# Patient Record
Sex: FEMALE | Race: WHITE | NOT HISPANIC OR LATINO | Employment: FULL TIME | URBAN - METROPOLITAN AREA
[De-identification: names, ages, dates, MRNs, and addresses within clinical notes are randomized per-mention and may not be internally consistent; named-entity substitution may affect disease eponyms.]

---

## 2024-04-17 ENCOUNTER — OFFICE VISIT (OUTPATIENT)
Dept: URGENT CARE | Facility: CLINIC | Age: 54
End: 2024-04-17

## 2024-04-17 ENCOUNTER — APPOINTMENT (OUTPATIENT)
Dept: URGENT CARE | Facility: CLINIC | Age: 54
End: 2024-04-17

## 2024-04-17 VITALS
SYSTOLIC BLOOD PRESSURE: 104 MMHG | BODY MASS INDEX: 25.11 KG/M2 | OXYGEN SATURATION: 97 % | RESPIRATION RATE: 16 BRPM | HEIGHT: 67 IN | TEMPERATURE: 97.1 F | DIASTOLIC BLOOD PRESSURE: 62 MMHG | WEIGHT: 160 LBS | HEART RATE: 92 BPM

## 2024-04-17 DIAGNOSIS — Z91.09 ENVIRONMENTAL ALLERGIES: ICD-10-CM

## 2024-04-17 PROCEDURE — 99203 OFFICE O/P NEW LOW 30 MIN: CPT | Performed by: FAMILY MEDICINE

## 2024-04-17 ASSESSMENT — FIBROSIS 4 INDEX: FIB4 SCORE: 2.48

## 2024-04-17 NOTE — PROGRESS NOTES
"  Subjective:      53 y.o. female presents to urgent care for cold symptoms that have been present since January.  She is experiencing increased nasal congestion, bilateral ear pain, and sore throat.  No fever or cough.  No tobacco product use.  No history of asthma or COPD.  She is vaccinated against COVID.  No known sick contacts.        She denies any other questions or concerns at this time.    Current problem list, medication, and past medical/surgical history were reviewed in Epic.    ROS  See HPI     Objective:      /62   Pulse 92   Temp 36.2 °C (97.1 °F)   Resp 16   Ht 1.702 m (5' 7\")   Wt 72.6 kg (160 lb)   SpO2 97%   BMI 25.06 kg/m²     Physical Exam  Constitutional:       General: She is not in acute distress.     Appearance: She is not diaphoretic.   HENT:      Right Ear: Ear canal and external ear normal. Tympanic membrane is bulging. Tympanic membrane is not erythematous.      Left Ear: Ear canal and external ear normal. Tympanic membrane is bulging. Tympanic membrane is not erythematous.      Mouth/Throat:      Tongue: Tongue does not deviate from midline.      Palate: No lesions.      Pharynx: Uvula midline. No oropharyngeal exudate or posterior oropharyngeal erythema.      Tonsils: No tonsillar exudate.   Cardiovascular:      Rate and Rhythm: Normal rate and regular rhythm.      Heart sounds: Normal heart sounds.   Pulmonary:      Effort: Pulmonary effort is normal. No respiratory distress.      Breath sounds: Normal breath sounds.   Neurological:      Mental Status: She is alert.   Psychiatric:         Mood and Affect: Affect normal.         Judgment: Judgment normal.       Assessment/Plan:     1. Environmental allergies  No sign of bacterial infection on physical exam.  Most consistent with environmental allergies.  She was encouraged to use Flonase and a nondrowsy antihistamine daily.      Instructed to return to Urgent Care or nearest Emergency Department if symptoms fail to " improve, for any change in condition, further concerns, or new concerning symptoms. Patient states understanding of the plan of care and discharge instructions.    Aileen Carrillo M.D.

## 2024-04-17 NOTE — LETTER
April 17, 2024    To Whom It May Concern:         This is confirmation that Laila Hewitt Jorge attended her scheduled appointment with Aileen Carrillo M.D. on 4/17/24. She may return to work tomorrow without any restrictions.          If you have any questions please do not hesitate to call me at the phone number listed below.    Sincerely,          Aileen Carrillo M.D.  [phone]

## 2025-01-31 ENCOUNTER — OFFICE VISIT (OUTPATIENT)
Dept: URGENT CARE | Facility: CLINIC | Age: 55
End: 2025-01-31
Payer: MEDICAID

## 2025-01-31 VITALS
DIASTOLIC BLOOD PRESSURE: 74 MMHG | OXYGEN SATURATION: 97 % | HEART RATE: 90 BPM | HEIGHT: 67 IN | SYSTOLIC BLOOD PRESSURE: 110 MMHG | RESPIRATION RATE: 16 BRPM | WEIGHT: 166.3 LBS | BODY MASS INDEX: 26.1 KG/M2 | TEMPERATURE: 96.9 F

## 2025-01-31 DIAGNOSIS — J01.00 ACUTE NON-RECURRENT MAXILLARY SINUSITIS: ICD-10-CM

## 2025-01-31 RX ORDER — METHYLPREDNISOLONE 4 MG/1
4 TABLET ORAL DAILY
Qty: 21 EACH | Refills: 0 | Status: SHIPPED | OUTPATIENT
Start: 2025-01-31

## 2025-01-31 RX ORDER — ACETAMINOPHEN, DEXTROMETHORPHAN HYDROBROMIDE, GUAIFENESIN, AND PHENYLEPHRINE HYDROCHLORIDE 325; 10; 200; 5 MG/1; MG/1; MG/1; MG/1
TABLET, FILM COATED ORAL
COMMUNITY

## 2025-01-31 NOTE — PROGRESS NOTES
Chief Complaint   Patient presents with    Sinus Problem     xThursday. Vomiting, sinus pressure, ringing in both ears, nasal congestion, runny nose. Requesting work note for Thursday and Friday.        HISTORY OF PRESENT ILLNESS: Patient is a pleasant 54 y.o. female who presents today due to two days of nasal congestion, tactile fever, chills, headache, and sinus pressure. Endorses eye pressure and teeth pain. Symptoms started after 24 hours of vomiting and diarrhea.  She denies associated cough, difficulty breathing, confusion. She has tried OTC cold/sinus medication at home without much improvement. Denies a history of seasonal allergies or sinus infections in the past. No known ill contacts at home. No recent antibiotic usage.       There are no active problems to display for this patient.      Allergies:Patient has no known allergies.    Current Outpatient Medications Ordered in Epic   Medication Sig Dispense Refill    Phenylephrine-Ibuprofen (ADVIL SINUS CONGESTION & PAIN PO) Take  by mouth.      Phenylephrine-DM-GG-APAP (MUCINEX SINUS-MAX) 5--325 MG Tab Take  by mouth.      methylPREDNISolone (MEDROL DOSEPAK) 4 MG Tablet Therapy Pack Take 1 Tablet by mouth every day. Take with food. 21 Each 0    amoxicillin-clavulanate (AUGMENTIN) 875-125 MG Tab Take 1 Tablet by mouth 2 times a day for 7 days. 14 Tablet 0     No current Epic-ordered facility-administered medications on file.       History reviewed. No pertinent past medical history.    Social History     Tobacco Use    Smoking status: Never    Smokeless tobacco: Never   Vaping Use    Vaping status: Never Used   Substance Use Topics    Alcohol use: Not Currently    Drug use: Not Currently       No family status information on file.   History reviewed. No pertinent family history.    ROS:  Review of Systems   Constitutional: Positive for tactile fever, chills, fatigue. Negative for weight loss.   HENT: Positive for dental pressure, sinus pressure, sore  "throat, nasal congestion. Negative for ear pain, nosebleeds, neck pain.    Eyes: Positive for eye pressure.   Neuro: Positive for headache. Negative for sensory changes, weakness, seizure, LOC.  Cardiovascular: Negative for chest pain, palpitations, orthopnea and leg swelling.   Respiratory: Negative for cough, sputum production, shortness of breath and wheezing.   Gastrointestinal: Negative for abdominal pain, nausea, vomiting or diarrhea today.    Skin: Negative for rash, diaphoresis.     Exam:  /74   Pulse 90   Temp 36.1 °C (96.9 °F)   Resp 16   Ht 1.702 m (5' 7\")   Wt 75.4 kg (166 lb 4.8 oz)   SpO2 97%   General: well-nourished, well-developed female in NAD  Head: normocephalic, atraumatic  Eyes: PERRLA, no conjunctival injection, acuity grossly intact, lids normal.  Ears: normal shape and symmetry, no tenderness, no discharge. External canals are without any significant edema or erythema. Tympanic membranes are without any inflammation, no effusion. Gross auditory acuity is intact.  Nose: symmetrical without tenderness, erythema and swelling noted bilateral turbinates, clear discharge. Maxillary sinus tenderness.   Mouth/Throat: reasonable hygiene, no exudates or tonsillar enlargement. Erythema is present.   Neck: no masses, range of motion within normal limits, no tracheal deviation. No obvious thyroid enlargement.   Lymph: no cervical adenopathy. No supraclavicular adenopathy.   Neuro: alert and oriented. Cranial nerves 1-12 grossly intact. No sensory deficit.   Cardiovascular: regular rate and rhythm. No edema.  Pulmonary: no distress. Chest is symmetrical with respiration, no wheezes, crackles, or rhonchi.   Musculoskeletal: no clubbing, appropriate muscle tone, gait is stable.  Skin: warm, dry, intact, no clubbing, no cyanosis, no rashes.   Psych: appropriate mood, affect, judgement.         Assessment/Plan:  1. Acute non-recurrent maxillary sinusitis  methylPREDNISolone (MEDROL DOSEPAK) 4 MG " Tablet Therapy Pack    amoxicillin-clavulanate (AUGMENTIN) 875-125 MG Tab            Will treat with antibiotic due to severe presentation. Augmentin and Medrol as directed. OTC oral allergy medication encouraged.  Sleep with HOB elevated, humidifier at night, rest, increase fluid intake.   Supportive care, differential diagnoses, and indications for immediate follow-up discussed with patient.   Pathogenesis of diagnosis discussed including typical length and natural progression.   Instructed to return to clinic or nearest emergency department for any change in condition, further concerns, or worsening of symptoms.  Patient states understanding of the plan of care and discharge instructions.  Instructed to make an appointment, for follow up, with her primary care provider.        Please note that this dictation was created using voice recognition software. I have made every reasonable attempt to correct obvious errors, but I expect that there are errors of grammar and possibly content that I did not discover before finalizing the note.       MICHELLE Barajas.

## 2025-01-31 NOTE — LETTER
January 31, 2025         Patient: Laila Jorge   YOB: 1970   Date of Visit: 1/31/2025           To Whom it May Concern:    Laila Jorge was seen in my clinic on 1/31/2025. She may be excused from work yesterday and today.     If you have any questions or concerns, please don't hesitate to call.        Sincerely,           MICHELLE Barajas.  Electronically Signed

## 2025-03-18 ENCOUNTER — OFFICE VISIT (OUTPATIENT)
Dept: URGENT CARE | Facility: CLINIC | Age: 55
End: 2025-03-18
Payer: MEDICAID

## 2025-03-18 ENCOUNTER — HOSPITAL ENCOUNTER (OUTPATIENT)
Dept: LAB | Facility: MEDICAL CENTER | Age: 55
End: 2025-03-18
Attending: NURSE PRACTITIONER
Payer: MEDICAID

## 2025-03-18 VITALS
WEIGHT: 166 LBS | TEMPERATURE: 97.8 F | HEART RATE: 99 BPM | RESPIRATION RATE: 18 BRPM | OXYGEN SATURATION: 96 % | SYSTOLIC BLOOD PRESSURE: 122 MMHG | DIASTOLIC BLOOD PRESSURE: 80 MMHG | BODY MASS INDEX: 26.06 KG/M2 | HEIGHT: 67 IN

## 2025-03-18 DIAGNOSIS — K92.1 BLOODY STOOLS: ICD-10-CM

## 2025-03-18 DIAGNOSIS — R10.30 LOWER ABDOMINAL PAIN: ICD-10-CM

## 2025-03-18 LAB
ALBUMIN SERPL BCP-MCNC: 4.8 G/DL (ref 3.2–4.9)
ALBUMIN/GLOB SERPL: 1.7 G/DL
ALP SERPL-CCNC: 77 U/L (ref 30–99)
ALT SERPL-CCNC: 99 U/L (ref 2–50)
ANION GAP SERPL CALC-SCNC: 17 MMOL/L (ref 7–16)
AST SERPL-CCNC: 162 U/L (ref 12–45)
BASOPHILS # BLD AUTO: 1.2 % (ref 0–1.8)
BASOPHILS # BLD: 0.1 K/UL (ref 0–0.12)
BILIRUB SERPL-MCNC: 0.8 MG/DL (ref 0.1–1.5)
BUN SERPL-MCNC: 8 MG/DL (ref 8–22)
CALCIUM ALBUM COR SERPL-MCNC: 9.2 MG/DL (ref 8.5–10.5)
CALCIUM SERPL-MCNC: 9.8 MG/DL (ref 8.5–10.5)
CHLORIDE SERPL-SCNC: 106 MMOL/L (ref 96–112)
CO2 SERPL-SCNC: 23 MMOL/L (ref 20–33)
CREAT SERPL-MCNC: 0.69 MG/DL (ref 0.5–1.4)
EOSINOPHIL # BLD AUTO: 0.1 K/UL (ref 0–0.51)
EOSINOPHIL NFR BLD: 1.2 % (ref 0–6.9)
ERYTHROCYTE [DISTWIDTH] IN BLOOD BY AUTOMATED COUNT: 45.1 FL (ref 35.9–50)
GFR SERPLBLD CREATININE-BSD FMLA CKD-EPI: 102 ML/MIN/1.73 M 2
GLOBULIN SER CALC-MCNC: 2.8 G/DL (ref 1.9–3.5)
GLUCOSE SERPL-MCNC: 97 MG/DL (ref 65–99)
HCT VFR BLD AUTO: 48 % (ref 37–47)
HGB BLD-MCNC: 16.5 G/DL (ref 12–16)
IMM GRANULOCYTES # BLD AUTO: 0.01 K/UL (ref 0–0.11)
IMM GRANULOCYTES NFR BLD AUTO: 0.1 % (ref 0–0.9)
LYMPHOCYTES # BLD AUTO: 2.95 K/UL (ref 1–4.8)
LYMPHOCYTES NFR BLD: 34.3 % (ref 22–41)
MCH RBC QN AUTO: 33.7 PG (ref 27–33)
MCHC RBC AUTO-ENTMCNC: 34.4 G/DL (ref 32.2–35.5)
MCV RBC AUTO: 98.2 FL (ref 81.4–97.8)
MONOCYTES # BLD AUTO: 0.78 K/UL (ref 0–0.85)
MONOCYTES NFR BLD AUTO: 9.1 % (ref 0–13.4)
NEUTROPHILS # BLD AUTO: 4.65 K/UL (ref 1.82–7.42)
NEUTROPHILS NFR BLD: 54.1 % (ref 44–72)
NRBC # BLD AUTO: 0 K/UL
NRBC BLD-RTO: 0 /100 WBC (ref 0–0.2)
PLATELET # BLD AUTO: 188 K/UL (ref 164–446)
PMV BLD AUTO: 9.5 FL (ref 9–12.9)
POTASSIUM SERPL-SCNC: 3.9 MMOL/L (ref 3.6–5.5)
PROT SERPL-MCNC: 7.6 G/DL (ref 6–8.2)
RBC # BLD AUTO: 4.89 M/UL (ref 4.2–5.4)
SODIUM SERPL-SCNC: 146 MMOL/L (ref 135–145)
WBC # BLD AUTO: 8.6 K/UL (ref 4.8–10.8)

## 2025-03-18 PROCEDURE — 36415 COLL VENOUS BLD VENIPUNCTURE: CPT

## 2025-03-18 PROCEDURE — 3079F DIAST BP 80-89 MM HG: CPT | Performed by: NURSE PRACTITIONER

## 2025-03-18 PROCEDURE — 3074F SYST BP LT 130 MM HG: CPT | Performed by: NURSE PRACTITIONER

## 2025-03-18 PROCEDURE — 80053 COMPREHEN METABOLIC PANEL: CPT

## 2025-03-18 PROCEDURE — 99214 OFFICE O/P EST MOD 30 MIN: CPT | Performed by: NURSE PRACTITIONER

## 2025-03-18 PROCEDURE — 85025 COMPLETE CBC W/AUTO DIFF WBC: CPT

## 2025-03-18 NOTE — LETTER
March 18, 2025         Patient: Laila Jorge   YOB: 1970   Date of Visit: 3/18/2025           To Whom it May Concern:    Laila Jorge was seen in my clinic on 3/18/2025. She may be excused from work yesterday, today, and tomorrow if needed.     If you have any questions or concerns, please don't hesitate to call.        Sincerely,           MICHELLE Barajas.  Electronically Signed

## 2025-03-18 NOTE — LETTER
March 18, 2025         Patient: Laila Jorge   YOB: 1970   Date of Visit: 3/18/2025           To Whom it May Concern:    Laila Jorge was seen in my clinic on 3/18/2025. She may be excused from work today.     If you have any questions or concerns, please don't hesitate to call.        Sincerely,           ESTEFANI Barajas  Electronically Signed

## 2025-03-18 NOTE — PROGRESS NOTES
Chief Complaint   Patient presents with    Bloody Stools     Pt states it does not feel like a hemorrhoid, bloating, painful, x 1 day        HISTORY OF PRESENT ILLNESS: Patient is a pleasant 54 y.o. female who presents to urgent care today with concerns of bloody stools.  Patient notes she had an episode of bright red blood in her stools yesterday.  Over the past month she has had lower abdominal pain with bloating.  She denies previous history of same.  She has seen her GYN who has planned an ultrasound for her.  She has had a colonoscopy 5 years ago, normal.  She has not take any medication for symptom relief.  She does not have a PCP.    There are no active problems to display for this patient.      Allergies:Patient has no known allergies.    Current Outpatient Medications Ordered in Epic   Medication Sig Dispense Refill    Phenylephrine-Ibuprofen (ADVIL SINUS CONGESTION & PAIN PO) Take  by mouth.      Phenylephrine-DM-GG-APAP (MUCINEX SINUS-MAX) 5--325 MG Tab Take  by mouth.       No current Epic-ordered facility-administered medications on file.       History reviewed. No pertinent past medical history.    Social History     Tobacco Use    Smoking status: Never    Smokeless tobacco: Never   Vaping Use    Vaping status: Never Used   Substance Use Topics    Alcohol use: Not Currently    Drug use: Not Currently       No family status information on file.   History reviewed. No pertinent family history.    ROS:  Review of Systems   Constitutional: Negative for fever, chills, weight loss, malaise, and fatigue.   HENT: Negative for ear pain, nosebleeds, congestion, sore throat and neck pain.    Eyes: Negative for vision changes.   Neuro: Negative for headache, sensory changes, weakness, seizure, LOC.   Cardiovascular: Negative for chest pain, palpitations, orthopnea and leg swelling.   Respiratory: Negative for cough, sputum production, shortness of breath and wheezing.   Gastrointestinal: Positive for  "abdominal pain, bloating, rectal bleeding.  Negative for nausea, vomiting or diarrhea.   Genitourinary: Negative for dysuria, urgency and frequency.  Musculoskeletal: Negative for falls, neck pain, back pain, joint pain, myalgias.   Skin: Negative for rash, diaphoresis.     Exam:  /80   Pulse 99   Temp 36.6 °C (97.8 °F)   Resp 18   Ht 1.702 m (5' 7\")   Wt 75.3 kg (166 lb)   SpO2 96%   General: well-nourished, well-developed female in NAD  Head: normocephalic, atraumatic  Eyes: PERRLA, no conjunctival injection, acuity grossly intact, lids normal.  Ears: normal shape and symmetry, no tenderness, no discharge. External canals are without any significant edema or erythema. Tympanic membranes are without any inflammation, no effusion. Gross auditory acuity is intact.  Nose: symmetrical without tenderness, no discharge.  Mouth/Throat: reasonable hygiene, no erythema, exudates or tonsillar enlargement.  Neck: no masses, range of motion within normal limits, no tracheal deviation. No obvious thyroid enlargement.   Lymph: no cervical adenopathy. No supraclavicular adenopathy.   Neuro: alert and oriented. Cranial nerves 1-12 grossly intact. No sensory deficit.   Cardiovascular: regular rate and rhythm. No edema.  Pulmonary: no distress. Chest is symmetrical with respiration, no wheezes, crackles, or rhonchi.   Abdomen: soft, diffuse lower tenderness, no guarding, no hepatosplenomegaly.  Musculoskeletal: no clubbing, appropriate muscle tone, gait is stable.  Skin: warm, dry, intact, no clubbing, no cyanosis, no rashes.   Psych: appropriate mood, affect, judgement.         Assessment/Plan:  1. Lower abdominal pain  CBC WITH DIFFERENTIAL    CT-ABDOMEN-PELVIS WITH    Comp Metabolic Panel    CANCELED: Comp Metabolic Panel      2. Bloody stools  CBC WITH DIFFERENTIAL    CT-ABDOMEN-PELVIS WITH    Comp Metabolic Panel    CANCELED: Comp Metabolic Panel          The patient is a pleasant 54-year-old who presents with GI " symptoms.  States over the past month she has had lower abdominal pressure and bloating.  She had onset of bright red rectal bleeding yesterday.  CBC, CMP, and CT abdomen ordered for patient, will follow-up accordingly.  Supportive care, differential diagnoses, and indications for immediate follow-up discussed with patient.   Pathogenesis of diagnosis discussed including typical length and natural progression.   Instructed to return to clinic or nearest emergency department for any change in condition, further concerns, or worsening of symptoms.  Patient states understanding of the plan of care and discharge instructions.  Instructed to make an appointment, to establish care and for follow up, with a primary care provider.        Please note that this dictation was created using voice recognition software. I have made every reasonable attempt to correct obvious errors, but I expect that there are errors of grammar and possibly content that I did not discover before finalizing the note.      MICHELLE Barajas.

## 2025-03-19 ENCOUNTER — RESULTS FOLLOW-UP (OUTPATIENT)
Dept: URGENT CARE | Facility: CLINIC | Age: 55
End: 2025-03-19

## 2025-03-19 DIAGNOSIS — R10.30 LOWER ABDOMINAL PAIN: ICD-10-CM

## 2025-03-19 DIAGNOSIS — K92.1 BLOODY STOOLS: ICD-10-CM

## 2025-03-19 DIAGNOSIS — R74.01 TRANSAMINITIS: ICD-10-CM

## 2025-03-20 NOTE — Clinical Note
REFERRAL APPROVAL NOTICE         Sent on March 20, 2025                   Laila Jorge  [address] [place] [address]                   Dear Ms. Jorge,    After a careful review of the medical information and benefit coverage, [facility] has processed your referral. See below for additional details.    If applicable, you must be actively enrolled with your insurance for coverage of the authorized service. If you have any questions regarding your coverage, please contact your insurance directly.    REFERRAL INFORMATION   Referral #:  [id number]  Referred-To Provider    Referred-By Provider:  Gastroenterology    ESTEFANI Barajas   [facility]      [address]  [address]  [place] [id number]  [phone] [address]  [place] [address]  [phone]    Referral Start Date:  03/20/2025  Referral End Date:   03/20/2026             SCHEDULING  If you do not already have an appointment, please call [phone] to make an appointment.     MORE INFORMATION  If you do not already have a [employer] account, sign up at: [email or web address]  You can access your medical information, make appointments, see lab results, billing information, and more.  If you have questions regarding this referral, please contact  the [facility] Referrals department at:             [phone]. Monday - Friday 8:00AM - 5:00PM.     Sincerely,    [facility]

## 2025-06-09 ENCOUNTER — OFFICE VISIT (OUTPATIENT)
Dept: MEDICAL GROUP | Facility: CLINIC | Age: 55
End: 2025-06-09
Payer: MEDICAID

## 2025-06-09 VITALS
DIASTOLIC BLOOD PRESSURE: 74 MMHG | BODY MASS INDEX: 24.97 KG/M2 | OXYGEN SATURATION: 95 % | WEIGHT: 159.1 LBS | HEART RATE: 85 BPM | RESPIRATION RATE: 16 BRPM | TEMPERATURE: 97.1 F | SYSTOLIC BLOOD PRESSURE: 107 MMHG | HEIGHT: 67 IN

## 2025-06-09 DIAGNOSIS — K59.09 CHRONIC CONSTIPATION: Primary | ICD-10-CM

## 2025-06-09 DIAGNOSIS — Z91.89 AT RISK FOR DEPRESSION: ICD-10-CM

## 2025-06-09 DIAGNOSIS — Z73.3 MENTAL DISTRESS EVIDENT ON EXAMINATION: ICD-10-CM

## 2025-06-09 DIAGNOSIS — F33.2 SEVERE EPISODE OF RECURRENT MAJOR DEPRESSIVE DISORDER, WITHOUT PSYCHOTIC FEATURES (HCC): ICD-10-CM

## 2025-06-09 DIAGNOSIS — Z87.898 HISTORY OF ALCOHOL USE DISORDER: ICD-10-CM

## 2025-06-09 PROBLEM — F32.9 MAJOR DEPRESSIVE DISORDER: Status: ACTIVE | Noted: 2025-06-09

## 2025-06-09 PROCEDURE — 3074F SYST BP LT 130 MM HG: CPT

## 2025-06-09 PROCEDURE — 99213 OFFICE O/P EST LOW 20 MIN: CPT | Mod: GE

## 2025-06-09 PROCEDURE — 3078F DIAST BP <80 MM HG: CPT

## 2025-06-09 RX ORDER — POLYETHYLENE GLYCOL 3350 17 G/17G
17 POWDER, FOR SOLUTION ORAL DAILY
COMMUNITY

## 2025-06-09 SDOH — HEALTH STABILITY: PHYSICAL HEALTH: ON AVERAGE, HOW MANY MINUTES DO YOU ENGAGE IN EXERCISE AT THIS LEVEL?: PATIENT DECLINED

## 2025-06-09 SDOH — ECONOMIC STABILITY: TRANSPORTATION INSECURITY
IN THE PAST 12 MONTHS, HAS LACK OF TRANSPORTATION KEPT YOU FROM MEETINGS, WORK, OR FROM GETTING THINGS NEEDED FOR DAILY LIVING?: PATIENT DECLINED

## 2025-06-09 SDOH — ECONOMIC STABILITY: FOOD INSECURITY: WITHIN THE PAST 12 MONTHS, THE FOOD YOU BOUGHT JUST DIDN'T LAST AND YOU DIDN'T HAVE MONEY TO GET MORE.: PATIENT DECLINED

## 2025-06-09 SDOH — ECONOMIC STABILITY: INCOME INSECURITY: HOW HARD IS IT FOR YOU TO PAY FOR THE VERY BASICS LIKE FOOD, HOUSING, MEDICAL CARE, AND HEATING?: PATIENT DECLINED

## 2025-06-09 SDOH — ECONOMIC STABILITY: INCOME INSECURITY: IN THE LAST 12 MONTHS, WAS THERE A TIME WHEN YOU WERE NOT ABLE TO PAY THE MORTGAGE OR RENT ON TIME?: PATIENT DECLINED

## 2025-06-09 SDOH — ECONOMIC STABILITY: TRANSPORTATION INSECURITY
IN THE PAST 12 MONTHS, HAS THE LACK OF TRANSPORTATION KEPT YOU FROM MEDICAL APPOINTMENTS OR FROM GETTING MEDICATIONS?: PATIENT DECLINED

## 2025-06-09 SDOH — HEALTH STABILITY: PHYSICAL HEALTH
ON AVERAGE, HOW MANY DAYS PER WEEK DO YOU ENGAGE IN MODERATE TO STRENUOUS EXERCISE (LIKE A BRISK WALK)?: PATIENT DECLINED

## 2025-06-09 SDOH — ECONOMIC STABILITY: FOOD INSECURITY: WITHIN THE PAST 12 MONTHS, YOU WORRIED THAT YOUR FOOD WOULD RUN OUT BEFORE YOU GOT MONEY TO BUY MORE.: PATIENT DECLINED

## 2025-06-09 SDOH — HEALTH STABILITY: MENTAL HEALTH
STRESS IS WHEN SOMEONE FEELS TENSE, NERVOUS, ANXIOUS, OR CAN'T SLEEP AT NIGHT BECAUSE THEIR MIND IS TROUBLED. HOW STRESSED ARE YOU?: VERY MUCH

## 2025-06-09 SDOH — ECONOMIC STABILITY: HOUSING INSECURITY
IN THE LAST 12 MONTHS, WAS THERE A TIME WHEN YOU DID NOT HAVE A STEADY PLACE TO SLEEP OR SLEPT IN A SHELTER (INCLUDING NOW)?: PATIENT DECLINED

## 2025-06-09 SDOH — ECONOMIC STABILITY: TRANSPORTATION INSECURITY
IN THE PAST 12 MONTHS, HAS LACK OF RELIABLE TRANSPORTATION KEPT YOU FROM MEDICAL APPOINTMENTS, MEETINGS, WORK OR FROM GETTING THINGS NEEDED FOR DAILY LIVING?: PATIENT DECLINED

## 2025-06-09 ASSESSMENT — SOCIAL DETERMINANTS OF HEALTH (SDOH)
HOW MANY DRINKS CONTAINING ALCOHOL DO YOU HAVE ON A TYPICAL DAY WHEN YOU ARE DRINKING: PATIENT DECLINED
HOW HARD IS IT FOR YOU TO PAY FOR THE VERY BASICS LIKE FOOD, HOUSING, MEDICAL CARE, AND HEATING?: PATIENT DECLINED
DO YOU BELONG TO ANY CLUBS OR ORGANIZATIONS SUCH AS CHURCH GROUPS UNIONS, FRATERNAL OR ATHLETIC GROUPS, OR SCHOOL GROUPS?: NO
DO YOU BELONG TO ANY CLUBS OR ORGANIZATIONS SUCH AS CHURCH GROUPS UNIONS, FRATERNAL OR ATHLETIC GROUPS, OR SCHOOL GROUPS?: NO
HOW OFTEN DO YOU HAVE SIX OR MORE DRINKS ON ONE OCCASION: PATIENT DECLINED
HOW OFTEN DO YOU ATTEND CHURCH OR RELIGIOUS SERVICES?: PATIENT DECLINED
ARE YOU MARRIED, WIDOWED, DIVORCED, SEPARATED, NEVER MARRIED, OR LIVING WITH A PARTNER?: PATIENT DECLINED
HOW OFTEN DO YOU ATTENT MEETINGS OF THE CLUB OR ORGANIZATION YOU BELONG TO?: PATIENT DECLINED
IN THE PAST 12 MONTHS, HAS THE ELECTRIC, GAS, OIL, OR WATER COMPANY THREATENED TO SHUT OFF SERVICE IN YOUR HOME?: PATIENT DECLINED
ARE YOU MARRIED, WIDOWED, DIVORCED, SEPARATED, NEVER MARRIED, OR LIVING WITH A PARTNER?: PATIENT DECLINED
HOW OFTEN DO YOU GET TOGETHER WITH FRIENDS OR RELATIVES?: TWICE A WEEK
HOW OFTEN DO YOU ATTENT MEETINGS OF THE CLUB OR ORGANIZATION YOU BELONG TO?: PATIENT DECLINED
WITHIN THE PAST 12 MONTHS, YOU WORRIED THAT YOUR FOOD WOULD RUN OUT BEFORE YOU GOT THE MONEY TO BUY MORE: PATIENT DECLINED
HOW OFTEN DO YOU ATTEND CHURCH OR RELIGIOUS SERVICES?: PATIENT DECLINED
HOW OFTEN DO YOU HAVE A DRINK CONTAINING ALCOHOL: PATIENT DECLINED
IN A TYPICAL WEEK, HOW MANY TIMES DO YOU TALK ON THE PHONE WITH FAMILY, FRIENDS, OR NEIGHBORS?: TWICE A WEEK
HOW OFTEN DO YOU GET TOGETHER WITH FRIENDS OR RELATIVES?: TWICE A WEEK
IN A TYPICAL WEEK, HOW MANY TIMES DO YOU TALK ON THE PHONE WITH FAMILY, FRIENDS, OR NEIGHBORS?: TWICE A WEEK

## 2025-06-09 ASSESSMENT — PATIENT HEALTH QUESTIONNAIRE - PHQ9
5. POOR APPETITE OR OVEREATING: 3 - NEARLY EVERY DAY
SUM OF ALL RESPONSES TO PHQ QUESTIONS 1-9: 24
CLINICAL INTERPRETATION OF PHQ2 SCORE: 6

## 2025-06-09 ASSESSMENT — FIBROSIS 4 INDEX: FIB4 SCORE: 4.85

## 2025-06-09 ASSESSMENT — LIFESTYLE VARIABLES
HOW OFTEN DO YOU HAVE A DRINK CONTAINING ALCOHOL: PATIENT DECLINED
HOW MANY STANDARD DRINKS CONTAINING ALCOHOL DO YOU HAVE ON A TYPICAL DAY: PATIENT DECLINED
SKIP TO QUESTIONS 9-10: 0
AUDIT-C TOTAL SCORE: -1
HOW OFTEN DO YOU HAVE SIX OR MORE DRINKS ON ONE OCCASION: PATIENT DECLINED
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
## 2025-06-09 NOTE — ASSESSMENT & PLAN NOTE
PHQ-9 score 24.  Denies SI/HI.  Deaths of multiple family members, recent job loss, and ongoing abdominal pains/constipation contributing to her symptoms.  Social support includes her boyfriend of 3 years, local aunt and cousins who she does not see very often.  Interested in therapy but not yet established.  - Discussed importance of lifestyle interventions, including exercise, therapy, healthy diet, regular movement activities.  Explained the goal of medication is to help her be able to initiate these health behaviors.  - She declines initiation of pharmacotherapy at this time.  - Recommended scheduling with Dr. Jason, she agrees with plan  - Address contributing factors, see #history of AUD, #constipation  - Follow-up in 4 weeks

## 2025-06-09 NOTE — PATIENT INSTRUCTIONS
Miralax Regimen: 19 capfulls in a large container of gatorade. Sip throughout the day with lots of water  Metamucil: 1-2 tablespoons in a glass of water per day. Stir and drink promptly  High fiber foods: increase intake regularly    Schedule with Dr. Carmichael (the psychologist).

## 2025-06-09 NOTE — ASSESSMENT & PLAN NOTE
Chronic abdominal pains, history consistent with overflow constipation.  Using some bowel regimen, but not using consistently. Already established with GI, scheduled for endoscopy (upper/lower) in August.  Bloody stools initially present, now resolved.  Does have significant history of AUD, history of transaminitis on prior labs.  - Will start with bowel regimen: 1 bottle of MiraLAX mixed with 1 large bottle of Gatorade, sip throughout the day.  Drink with lots of water  - Once cleared out, start Metamucil, 1 to 2 tablespoons in water per day.  - Add high-fiber foods to diet once cleared out  - Follow-up with GI as scheduled  - Follow-up in 4 weeks, will plan for laboratory testing for liver monitoring at that time given alcohol use history and reported prior GI bleeding

## 2025-06-09 NOTE — ASSESSMENT & PLAN NOTE
"History of alcohol use disorder, reports drinking beer, vodka, wine, and hard liquors on a daily basis.  States she would \"drink until I was drunk and then fall asleep.\"  Stopped drinking in February 2025 when she began to have GI symptoms, including nausea/vomiting, constipation, and some blood in her stool.  Has already been seen by GI, scheduled for endoscopy in August.  Bloody stools have resolved.  - Continue to monitor for bloody stool recurrence  - Recommended therapy, patient states she will schedule with Dr. Jason  - Will discuss laboratory screenings for liver at follow-up  "

## 2025-06-24 ENCOUNTER — APPOINTMENT (OUTPATIENT)
Dept: MEDICAL GROUP | Facility: CLINIC | Age: 55
End: 2025-06-24
Payer: MEDICAID

## 2025-06-30 ENCOUNTER — HOSPITAL ENCOUNTER (OUTPATIENT)
Dept: RADIOLOGY | Facility: MEDICAL CENTER | Age: 55
End: 2025-06-30
Attending: OBSTETRICS & GYNECOLOGY
Payer: MEDICAID

## 2025-06-30 DIAGNOSIS — N95.0 POSTMENOPAUSAL BLEEDING: ICD-10-CM

## 2025-06-30 PROCEDURE — 76830 TRANSVAGINAL US NON-OB: CPT

## 2025-07-08 ENCOUNTER — APPOINTMENT (OUTPATIENT)
Dept: MEDICAL GROUP | Facility: CLINIC | Age: 55
End: 2025-07-08
Payer: MEDICAID

## 2025-07-08 DIAGNOSIS — F33.2 SEVERE EPISODE OF RECURRENT MAJOR DEPRESSIVE DISORDER, WITHOUT PSYCHOTIC FEATURES (HCC): ICD-10-CM

## 2025-07-08 DIAGNOSIS — Z87.898 HISTORY OF ALCOHOL USE DISORDER: Primary | ICD-10-CM

## 2025-07-08 PROCEDURE — 90834 PSYTX W PT 45 MINUTES: CPT | Performed by: PSYCHOLOGIST

## 2025-07-08 NOTE — PROGRESS NOTES
[facility]  Psychotherapy Consult        Patient Name: Laila Jorge  Patient MRN: [id number]  Today's Date:  7/8/25    Type of session: intake assessment  Session start time: 11  Session stop time: 11:45  Length of time spent face to face with patient: 45  Persons in attendance: patient    Diagnoses:   1. Severe episode of recurrent major depressive disorder, without psychotic features (HCC)    2. History of alcohol use disorder       Presents with multiple losses of family members, her mother  3 years ago, father and sister all within a few years.  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    Has been drinking steadily, sober for 3 months.   Stopped because afraid it was killing her.    Feels depressed, in bed a lot when not helping her aunt.  Also connected to cousins.    Has a boyfriend who is a meaningful support.    Lives alone with her cat.    Supported by a trust fund.    No SI. Could never hurt her cousins/aunt.  JUAN PABLO:  sober 3 months, marijuana for sleep.      No interest in AA.  Very open to outpatient treatment, referred to [facility], [facility], [facility], [facility].  And can f/u with this writer in the future for support/consultation.    Kristy Carmichael, Ph.D.

## 2025-07-14 NOTE — Clinical Note
REFERRAL APPROVAL NOTICE         Sent on July 14, 2025                   Laila Jorge  [address] [place] [address]                   Dear Ms. Jorge,    After a careful review of the medical information and benefit coverage, [facility] has processed your referral. See below for additional details.    If applicable, you must be actively enrolled with your insurance for coverage of the authorized service. If you have any questions regarding your coverage, please contact your insurance directly.    REFERRAL INFORMATION   Referral #:  [id number]  Referred-To Department    Referred-By Provider:  Behavioral Health    Kristy Carmichael, Ph.D.   Behavioral Health Outpatient      [address]  [place] [address]  [phone] [address]  [place] [id number] [phone]    Referral Start Date:  07/08/2025  Referral End Date:   07/08/2026             SCHEDULING  If you do not already have an appointment, please call [phone] to make an appointment.     MORE INFORMATION  If you do not already have a [employer] account, sign up at: [email or web address]  You can access your medical information, make appointments, see lab results, billing information, and more.  If you have questions regarding this referral, please contact  the [facility] Referrals department at:             [phone]. Monday - Friday 8:00AM - 5:00PM.     Sincerely,    [facility]

## 2025-07-15 ENCOUNTER — APPOINTMENT (OUTPATIENT)
Dept: MEDICAL GROUP | Facility: CLINIC | Age: 55
End: 2025-07-15
Payer: MEDICAID

## 2025-07-15 VITALS
OXYGEN SATURATION: 96 % | WEIGHT: 153 LBS | HEART RATE: 92 BPM | DIASTOLIC BLOOD PRESSURE: 83 MMHG | RESPIRATION RATE: 12 BRPM | BODY MASS INDEX: 24.01 KG/M2 | HEIGHT: 67 IN | TEMPERATURE: 97 F | SYSTOLIC BLOOD PRESSURE: 117 MMHG

## 2025-07-15 DIAGNOSIS — K59.09 CHRONIC CONSTIPATION: ICD-10-CM

## 2025-07-15 DIAGNOSIS — R93.5 ABNORMAL ULTRASOUND OF OVARY: Primary | ICD-10-CM

## 2025-07-15 DIAGNOSIS — F33.2 SEVERE EPISODE OF RECURRENT MAJOR DEPRESSIVE DISORDER, WITHOUT PSYCHOTIC FEATURES (HCC): ICD-10-CM

## 2025-07-15 DIAGNOSIS — Z91.89 AT RISK FOR DEPRESSION: ICD-10-CM

## 2025-07-15 DIAGNOSIS — Z73.3 MENTAL DISTRESS EVIDENT ON EXAMINATION: ICD-10-CM

## 2025-07-15 PROCEDURE — 3074F SYST BP LT 130 MM HG: CPT | Mod: GC

## 2025-07-15 PROCEDURE — 99214 OFFICE O/P EST MOD 30 MIN: CPT | Mod: GC

## 2025-07-15 PROCEDURE — 3079F DIAST BP 80-89 MM HG: CPT | Mod: GC

## 2025-07-15 RX ORDER — BUPROPION HYDROCHLORIDE 150 MG/1
TABLET ORAL
Qty: 173 TABLET | Refills: 0 | Status: SHIPPED | OUTPATIENT
Start: 2025-07-15 | End: 2025-10-13

## 2025-07-15 RX ORDER — LORAZEPAM 0.5 MG/1
0.5 TABLET ORAL
Qty: 1 TABLET | Refills: 0 | Status: SHIPPED | OUTPATIENT
Start: 2025-07-15 | End: 2025-07-15

## 2025-07-15 ASSESSMENT — FIBROSIS 4 INDEX: FIB4 SCORE: 4.85

## 2025-07-16 NOTE — ASSESSMENT & PLAN NOTE
Recent workup ordered by GYN Dr. Aldana for postmenopausal bleeding, ultrasound revealed uterine fibroid, left ovary is hypoechoic.  Follow-up MRI recommended.  - MRI of pelvis to evaluate both ovaries and fibroids  - Anxiolytic (Ativan 1 dose) for claustrophobia for MRI.  - Discussed use of topical/vaginal estrogen for post-menopausal vaginal/cervical tissue irritation causing spotting after sex. Explained that it has local effects without systemic risks of oral hormone replacement therapy.  Will defer prescription of topical estrogen pending MRI results  - Follow-up appointment with Dr. Aldana post-MRI for further discussion and management regarding gynecological concerns.

## 2025-07-16 NOTE — ASSESSMENT & PLAN NOTE
- discussed starting bupropion (Wellbutrin) as it typically has a faster onset of action (within a week) and a lower incidence of sexual side effects compared to other antidepressants. Counselled on potential risks, especially for those with a history of seizures or alcohol withdrawal seizures.  - Start bupropion 150 mg XL daily for 7 days, then increase to 300 mg daily.  Preferred once-daily formulation, ideally taken in the morning.  - Counseled on resources for mental health support: [facility] for 24-hour crisis intervention, and national suicide hotline (386).  - Advised on the importance of finding a suitable therapist.  - Follow-up in 6 weeks
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
## 2025-07-16 NOTE — ASSESSMENT & PLAN NOTE
Persistent GI concerns, see HPI for details.  Referred to GI at prior appointment, scheduled for endoscopy in 2 weeks.  - Continue follow-up with GI for abdominal pain and awaiting colonoscopy/endoscopy results.

## 2025-09-02 ENCOUNTER — APPOINTMENT (OUTPATIENT)
Dept: MEDICAL GROUP | Facility: CLINIC | Age: 55
End: 2025-09-02
Payer: MEDICAID